# Patient Record
Sex: FEMALE | Race: BLACK OR AFRICAN AMERICAN | NOT HISPANIC OR LATINO | URBAN - METROPOLITAN AREA
[De-identification: names, ages, dates, MRNs, and addresses within clinical notes are randomized per-mention and may not be internally consistent; named-entity substitution may affect disease eponyms.]

---

## 2022-10-25 DIAGNOSIS — D72.819 LEUKOPENIA, UNSPECIFIED TYPE: Primary | ICD-10-CM

## 2022-11-16 ENCOUNTER — OFFICE VISIT (OUTPATIENT)
Dept: HEMATOLOGY/ONCOLOGY | Facility: CLINIC | Age: 52
End: 2022-11-16
Payer: MEDICAID

## 2022-11-16 VITALS
SYSTOLIC BLOOD PRESSURE: 124 MMHG | HEART RATE: 67 BPM | DIASTOLIC BLOOD PRESSURE: 84 MMHG | HEIGHT: 65 IN | RESPIRATION RATE: 20 BRPM | WEIGHT: 165.13 LBS | BODY MASS INDEX: 27.51 KG/M2 | TEMPERATURE: 98 F | OXYGEN SATURATION: 97 %

## 2022-11-16 DIAGNOSIS — D69.6 THROMBOCYTOPENIA: ICD-10-CM

## 2022-11-16 DIAGNOSIS — D72.819 LEUKOPENIA, UNSPECIFIED TYPE: Primary | ICD-10-CM

## 2022-11-16 PROCEDURE — 3079F DIAST BP 80-89 MM HG: CPT | Mod: CPTII,,, | Performed by: INTERNAL MEDICINE

## 2022-11-16 PROCEDURE — 3008F PR BODY MASS INDEX (BMI) DOCUMENTED: ICD-10-PCS | Mod: CPTII,,, | Performed by: INTERNAL MEDICINE

## 2022-11-16 PROCEDURE — 1159F MED LIST DOCD IN RCRD: CPT | Mod: CPTII,,, | Performed by: INTERNAL MEDICINE

## 2022-11-16 PROCEDURE — 3079F PR MOST RECENT DIASTOLIC BLOOD PRESSURE 80-89 MM HG: ICD-10-PCS | Mod: CPTII,,, | Performed by: INTERNAL MEDICINE

## 2022-11-16 PROCEDURE — 99203 PR OFFICE/OUTPT VISIT, NEW, LEVL III, 30-44 MIN: ICD-10-PCS | Mod: ,,, | Performed by: INTERNAL MEDICINE

## 2022-11-16 PROCEDURE — 99203 OFFICE O/P NEW LOW 30 MIN: CPT | Mod: ,,, | Performed by: INTERNAL MEDICINE

## 2022-11-16 PROCEDURE — 3008F BODY MASS INDEX DOCD: CPT | Mod: CPTII,,, | Performed by: INTERNAL MEDICINE

## 2022-11-16 PROCEDURE — 3074F SYST BP LT 130 MM HG: CPT | Mod: CPTII,,, | Performed by: INTERNAL MEDICINE

## 2022-11-16 PROCEDURE — 3074F PR MOST RECENT SYSTOLIC BLOOD PRESSURE < 130 MM HG: ICD-10-PCS | Mod: CPTII,,, | Performed by: INTERNAL MEDICINE

## 2022-11-16 PROCEDURE — 1159F PR MEDICATION LIST DOCUMENTED IN MEDICAL RECORD: ICD-10-PCS | Mod: CPTII,,, | Performed by: INTERNAL MEDICINE

## 2022-11-16 RX ORDER — LEVOTHYROXINE SODIUM 175 UG/1
TABLET ORAL
COMMUNITY
Start: 2022-09-29

## 2022-11-16 RX ORDER — FLUTICASONE PROPIONATE 50 MCG
2 SPRAY, SUSPENSION (ML) NASAL DAILY
COMMUNITY
Start: 2022-04-04

## 2022-11-16 RX ORDER — IBUPROFEN 800 MG/1
800 TABLET ORAL EVERY 6 HOURS PRN
COMMUNITY
Start: 2022-08-04

## 2022-11-16 RX ORDER — LINACLOTIDE 145 UG/1
145 CAPSULE, GELATIN COATED ORAL DAILY
COMMUNITY
Start: 2022-10-17

## 2022-11-16 RX ORDER — TRAMADOL HYDROCHLORIDE 50 MG/1
TABLET ORAL
COMMUNITY
Start: 2022-10-22

## 2022-11-16 RX ORDER — GABAPENTIN 100 MG/1
CAPSULE ORAL
COMMUNITY

## 2022-11-16 RX ORDER — SUMATRIPTAN SUCCINATE 100 MG/1
TABLET ORAL
COMMUNITY
Start: 2022-05-12

## 2022-11-16 RX ORDER — CETIRIZINE HYDROCHLORIDE 10 MG/1
TABLET ORAL
COMMUNITY

## 2022-11-16 RX ORDER — TRAZODONE HYDROCHLORIDE 50 MG/1
TABLET ORAL
COMMUNITY
Start: 2022-05-12

## 2022-11-16 RX ORDER — FAMOTIDINE 40 MG/1
40 TABLET, FILM COATED ORAL NIGHTLY
COMMUNITY
Start: 2022-09-29

## 2022-11-16 RX ORDER — CYCLOBENZAPRINE HCL 10 MG
TABLET ORAL
COMMUNITY

## 2022-11-16 NOTE — PROGRESS NOTES
[facility] at [facility]    PATIENT: Leslye Ravi  MRN: [id number]  DATE: 2022      Diagnosis:   1. Leukopenia, unspecified type    2. Thrombocytopenia        Chief Complaint:     Heme/Onc History: Pt referred for leucopenia and thrombocytopenia      Subjective:    Interval History: Ms. Ravi was referred for bi-cytopenia.  She has been aware of this for 3-4 years, and previously saw Dr. Zuñiga in [place]. She had lab work, but did not require a bone marrow.  I don't have any results of that evaluation.  She was referred here for a WBC of 2.09 and PLT of 122.  Her H/H is 12.0/36.0.  I have a CBC from 2021, and her WBC was 9.6 and her Plt were 110.  She denies frequent infections, fever, mouth sores, adenopathy.  She notes occasional night sweats (q 3-4 weeks for the past 12 months.)  She notes joint pain in her knees and hips, and has fairly significant back pain.  An MRI showed degenerative disease.     Past Medical History:   Past Medical History:   Diagnosis Date    Migraine     Sickle-cell disease without crisis     Sickle cell Trait       Past Surgical HIstory:   Past Surgical History:   Procedure Laterality Date    ABLATION       SECTION      LIPOMA RESECTION Bilateral     TONSILLECTOMY      TUBAL LIGATION         Family History:   Family History   Problem Relation Age of Onset    Arthritis Mother     Glaucoma Mother     Lung cancer Father     Hypertension Sister     Thyroid disease Sister     Seizures Brother     Hypertension Brother     Thyroid disease Brother        Social History:  reports that she has never smoked. She has never used smokeless tobacco. She reports current alcohol use. She reports that she does not use drugs.    Allergies:  Review of patient's allergies indicates:   Allergen Reactions    Verapamil      Other reaction(s): unknown    Penicillins Rash     Other reaction(s): unknown       Medications:  Current Outpatient Medications  "  Medication Sig Dispense Refill    cetirizine (ZYRTEC) 10 MG tablet cetirizine 10 mg tablet      cyclobenzaprine (FLEXERIL) 10 MG tablet cyclobenzaprine 10 mg tablet      famotidine (PEPCID) 40 MG tablet Take 40 mg by mouth every evening.      fluticasone propionate (FLONASE) 50 mcg/actuation nasal spray 2 sprays by Each Nostril route once daily.      gabapentin (NEURONTIN) 100 MG capsule gabapentin 100 mg capsule      ibuprofen (ADVIL,MOTRIN) 800 MG tablet Take 800 mg by mouth every 6 (six) hours as needed.      levothyroxine (SYNTHROID, LEVOTHROID) 175 MCG tablet levothyroxine 175 mcg tablet      LINZESS 145 mcg Cap capsule Take 145 mcg by mouth once daily.      sumatriptan (IMITREX) 100 MG tablet sumatriptan 100 mg tablet      traMADoL (ULTRAM) 50 mg tablet SMARTSI Tablet(s) By Mouth Every 12 Hours PRN      traZODone (DESYREL) 50 MG tablet trazodone 50 mg tablet       No current facility-administered medications for this visit.       Review of Systems   Constitutional:  Positive for diaphoresis.   Musculoskeletal:  Positive for arthralgias and back pain.   All other systems reviewed and are negative.    Objective:      Vitals:   Vitals:    22 1120   BP: 124/84   BP Location: Right arm   Patient Position: Sitting   BP Method: Medium (Automatic)   Pulse: 67   Resp: 20   Temp: 98.1 °F (36.7 °C)   TempSrc: Oral   SpO2: 97%   Weight: 74.9 kg (165 lb 1.6 oz)   Height: 5' 5" (1.651 m)     BMI: Body mass index is 27.47 kg/m².    Physical Exam  Vitals reviewed.   Constitutional:       Appearance: Normal appearance.   HENT:      Head: Normocephalic and atraumatic.   Cardiovascular:      Rate and Rhythm: Normal rate and regular rhythm.      Heart sounds: Normal heart sounds.   Pulmonary:      Effort: Pulmonary effort is normal.      Breath sounds: Normal breath sounds.   Abdominal:      General: Abdomen is flat. Bowel sounds are normal.      Palpations: Abdomen is soft.   Musculoskeletal:         General: Normal " range of motion.      Cervical back: Neck supple.   Neurological:      General: No focal deficit present.      Mental Status: She is alert.   Psychiatric:         Mood and Affect: Mood normal.         Behavior: Behavior normal.         Thought Content: Thought content normal.     Laboratory Data:      Imaging:   Assessment/Plan:       1. Leukopenia, unspecified type    2. Thrombocytopenia      Pt with bi-cytopenia.  Will do lab eval to include RAMONE, nutritional studies, hepatitis testing, anti-platelet Ab, and SPEP.  Follow up for results in 3 weeks.           Gregoria Welch MD

## 2022-12-19 ENCOUNTER — OFFICE VISIT (OUTPATIENT)
Dept: HEMATOLOGY/ONCOLOGY | Facility: CLINIC | Age: 52
End: 2022-12-19
Payer: MEDICAID

## 2022-12-19 VITALS
TEMPERATURE: 98 F | HEIGHT: 65 IN | DIASTOLIC BLOOD PRESSURE: 58 MMHG | OXYGEN SATURATION: 100 % | BODY MASS INDEX: 27.14 KG/M2 | SYSTOLIC BLOOD PRESSURE: 124 MMHG | WEIGHT: 162.88 LBS | HEART RATE: 60 BPM | RESPIRATION RATE: 18 BRPM

## 2022-12-19 DIAGNOSIS — D61.818 PANCYTOPENIA: Primary | ICD-10-CM

## 2022-12-19 PROCEDURE — 3074F PR MOST RECENT SYSTOLIC BLOOD PRESSURE < 130 MM HG: ICD-10-PCS | Mod: CPTII,,, | Performed by: INTERNAL MEDICINE

## 2022-12-19 PROCEDURE — 99214 OFFICE O/P EST MOD 30 MIN: CPT | Mod: ,,, | Performed by: INTERNAL MEDICINE

## 2022-12-19 PROCEDURE — 3074F SYST BP LT 130 MM HG: CPT | Mod: CPTII,,, | Performed by: INTERNAL MEDICINE

## 2022-12-19 PROCEDURE — 3008F BODY MASS INDEX DOCD: CPT | Mod: CPTII,,, | Performed by: INTERNAL MEDICINE

## 2022-12-19 PROCEDURE — 3008F PR BODY MASS INDEX (BMI) DOCUMENTED: ICD-10-PCS | Mod: CPTII,,, | Performed by: INTERNAL MEDICINE

## 2022-12-19 PROCEDURE — 99214 PR OFFICE/OUTPT VISIT, EST, LEVL IV, 30-39 MIN: ICD-10-PCS | Mod: ,,, | Performed by: INTERNAL MEDICINE

## 2022-12-19 PROCEDURE — 1159F PR MEDICATION LIST DOCUMENTED IN MEDICAL RECORD: ICD-10-PCS | Mod: CPTII,,, | Performed by: INTERNAL MEDICINE

## 2022-12-19 PROCEDURE — 3078F DIAST BP <80 MM HG: CPT | Mod: CPTII,,, | Performed by: INTERNAL MEDICINE

## 2022-12-19 PROCEDURE — 3078F PR MOST RECENT DIASTOLIC BLOOD PRESSURE < 80 MM HG: ICD-10-PCS | Mod: CPTII,,, | Performed by: INTERNAL MEDICINE

## 2022-12-19 PROCEDURE — 1159F MED LIST DOCD IN RCRD: CPT | Mod: CPTII,,, | Performed by: INTERNAL MEDICINE

## 2022-12-19 RX ORDER — OMEPRAZOLE 40 MG/1
40 CAPSULE, DELAYED RELEASE ORAL
COMMUNITY

## 2022-12-19 RX ORDER — NAPROXEN 500 MG/1
500 TABLET ORAL 2 TIMES DAILY WITH MEALS
COMMUNITY
Start: 2022-11-24

## 2022-12-19 NOTE — PROGRESS NOTES
[facility]cer Center at [facility]    PATIENT: Leslye Ravi  MRN: [id number]  DATE: 2022      Diagnosis:   1. Pancytopenia          Chief Complaint:     Heme/Onc History: Ms. Ravi was referred for bi-cytopenia.  She has been aware of this for 3-4 years, and previously saw Dr. Zuñiga in [place]. She had lab work, but did not require a bone marrow.  I don't have any results of that evaluation.  She was referred here for a WBC of 2.09 and PLT of 122.  Her H/H is 12.0/36.0.  I have a CBC from 2021, and her WBC was 9.6 and her Plt were 110.  She denies frequent infections, fever, mouth sores, adenopathy.  She notes occasional night sweats (q 3-4 weeks for the past 12 months.)  She notes joint pain in her knees and hips, and has fairly significant back pain.  An MRI showed degenerative disease.     Subjective:    Interval History: Ms. Ravi returns for follow up of pancytopenia.  Her evaluation was positive for anti-platelet antibodies and a positive SSA.  She notes occasional dry eyes and dry mouth, but her main complaints are continued joint pain. Hepatitis studies were negative.  Ferritin was elevated at 500, but other iron studies were normal, suggesting an inflammatory state.  B-12 and folate were normal.  RF and CCP were previously negative.  SPEP was normal.    Past Medical History:   Past Medical History:   Diagnosis Date    Migraine     Sickle cell trait        Past Surgical HIstory:   Past Surgical History:   Procedure Laterality Date    ABLATION       SECTION      LIPOMA RESECTION Bilateral     TONSILLECTOMY      TUBAL LIGATION         Family History:   Family History   Problem Relation Age of Onset    Arthritis Mother     Glaucoma Mother     Lung cancer Father     Hypertension Sister     Thyroid disease Sister     Seizures Brother     Hypertension Brother     Thyroid disease Brother        Social History:  reports that she has never smoked. She has never used  "smokeless tobacco. She reports current alcohol use. She reports that she does not use drugs.    Allergies:  Review of patient's allergies indicates:   Allergen Reactions    Verapamil      Other reaction(s): unknown    Penicillins Rash     Other reaction(s): unknown       Medications:  Current Outpatient Medications   Medication Sig Dispense Refill    cetirizine (ZYRTEC) 10 MG tablet cetirizine 10 mg tablet      famotidine (PEPCID) 40 MG tablet Take 40 mg by mouth every evening.      fluticasone propionate (FLONASE) 50 mcg/actuation nasal spray 2 sprays by Each Nostril route once daily.      gabapentin (NEURONTIN) 100 MG capsule gabapentin 100 mg capsule      ibuprofen (ADVIL,MOTRIN) 800 MG tablet Take 800 mg by mouth every 6 (six) hours as needed.      levothyroxine (SYNTHROID, LEVOTHROID) 175 MCG tablet levothyroxine 175 mcg tablet      LINZESS 145 mcg Cap capsule Take 145 mcg by mouth once daily.      naproxen (NAPROSYN) 500 MG tablet Take 500 mg by mouth 2 (two) times daily with meals.      omeprazole (PRILOSEC) 40 MG capsule Take 40 mg by mouth.      sumatriptan (IMITREX) 100 MG tablet sumatriptan 100 mg tablet      traMADoL (ULTRAM) 50 mg tablet SMARTSI Tablet(s) By Mouth Every 12 Hours PRN      traZODone (DESYREL) 50 MG tablet trazodone 50 mg tablet      cyclobenzaprine (FLEXERIL) 10 MG tablet cyclobenzaprine 10 mg tablet       No current facility-administered medications for this visit.       Review of Systems   Constitutional:  Positive for diaphoresis.   Musculoskeletal:  Positive for arthralgias and back pain.   All other systems reviewed and are negative.    Objective:      Vitals:   Vitals:    22 1323   BP: (!) 124/58   BP Location: Right arm   Patient Position: Sitting   BP Method: Medium (Automatic)   Pulse: 60   Resp: 18   Temp: 98 °F (36.7 °C)   TempSrc: Oral   SpO2: 100%   Weight: 73.9 kg (162 lb 14.4 oz)   Height: 5' 5" (1.651 m)       BMI: Body mass index is 27.11 kg/m².    Physical " Exam  Vitals reviewed.   Constitutional:       Appearance: Normal appearance.   HENT:      Head: Normocephalic and atraumatic.   Cardiovascular:      Rate and Rhythm: Normal rate and regular rhythm.      Heart sounds: Normal heart sounds.   Pulmonary:      Effort: Pulmonary effort is normal.      Breath sounds: Normal breath sounds.   Abdominal:      General: Abdomen is flat. Bowel sounds are normal.      Palpations: Abdomen is soft.   Musculoskeletal:         General: Normal range of motion.      Cervical back: Neck supple.   Neurological:      General: No focal deficit present.      Mental Status: She is alert.   Psychiatric:         Mood and Affect: Mood normal.         Behavior: Behavior normal.         Thought Content: Thought content normal.     Laboratory Data:      Imaging:   Assessment/Plan:       1. Pancytopenia    Pt with pancytopenia, which I suspect is related to autoimmune disease, but need to rule out marrow pathology.  Will schedule bone marrow and see her back 2 weeks afterwards.  Will refer to rheumatology.           Gregoria Welch MD

## 2023-01-03 DIAGNOSIS — D61.818 PANCYTOPENIA: Primary | ICD-10-CM

## 2023-01-11 ENCOUNTER — TELEPHONE (OUTPATIENT)
Dept: HEMATOLOGY/ONCOLOGY | Facility: CLINIC | Age: 53
End: 2023-01-11
Payer: MEDICAID

## 2023-01-19 ENCOUNTER — OFFICE VISIT (OUTPATIENT)
Dept: HEMATOLOGY/ONCOLOGY | Facility: CLINIC | Age: 53
End: 2023-01-19
Payer: MEDICAID

## 2023-01-19 VITALS — BODY MASS INDEX: 27.32 KG/M2 | HEIGHT: 65 IN | WEIGHT: 164 LBS

## 2023-01-19 DIAGNOSIS — R76.8 POSITIVE ANA (ANTINUCLEAR ANTIBODY): ICD-10-CM

## 2023-01-19 DIAGNOSIS — D61.818 PANCYTOPENIA: Primary | ICD-10-CM

## 2023-01-19 PROCEDURE — 1159F PR MEDICATION LIST DOCUMENTED IN MEDICAL RECORD: ICD-10-PCS | Mod: CPTII,95,, | Performed by: INTERNAL MEDICINE

## 2023-01-19 PROCEDURE — 3008F PR BODY MASS INDEX (BMI) DOCUMENTED: ICD-10-PCS | Mod: CPTII,95,, | Performed by: INTERNAL MEDICINE

## 2023-01-19 PROCEDURE — 3008F BODY MASS INDEX DOCD: CPT | Mod: CPTII,95,, | Performed by: INTERNAL MEDICINE

## 2023-01-19 PROCEDURE — 1159F MED LIST DOCD IN RCRD: CPT | Mod: CPTII,95,, | Performed by: INTERNAL MEDICINE

## 2023-01-19 PROCEDURE — 99213 OFFICE O/P EST LOW 20 MIN: CPT | Mod: 95,,, | Performed by: INTERNAL MEDICINE

## 2023-01-19 PROCEDURE — 99213 PR OFFICE/OUTPT VISIT, EST, LEVL III, 20-29 MIN: ICD-10-PCS | Mod: 95,,, | Performed by: INTERNAL MEDICINE

## 2023-01-19 RX ORDER — LACTULOSE 10 G/15ML
SOLUTION ORAL; RECTAL
COMMUNITY
Start: 2023-01-03

## 2023-01-19 RX ORDER — DICYCLOMINE HYDROCHLORIDE 10 MG/1
10 CAPSULE ORAL EVERY 6 HOURS PRN
COMMUNITY
Start: 2023-01-03

## 2023-01-19 NOTE — PROGRESS NOTES
Cancer Center at [facility]    PATIENT: Leslye Ravi  MRN: [id number]  DATE: 1/19/2023      Diagnosis:   1. Pancytopenia    2. Positive RAMONE (antinuclear antibody)            Chief Complaint:     Heme/Onc History: Ms. Ravi was referred for bi-cytopenia.  She has been aware of this for 3-4 years, and previously saw Dr. Zuñiga in [place]. She had lab work, but did not require a bone marrow.  I don't have any results of that evaluation.  She was referred here for a WBC of 2.09 and PLT of 122.  Her H/H is 12.0/36.0.  I have a CBC from August of 2021, and her WBC was 9.6 and her Plt were 110.  She denies frequent infections, fever, mouth sores, adenopathy.  She notes occasional night sweats (q 3-4 weeks for the past 12 months.)  She notes joint pain in her knees and hips, and has fairly significant back pain.  An MRI showed degenerative disease.     1/13/2022 bone Marrow Biopsy:  normocellular marrow (40%) with trilineage hematopoiesis, minimal reactive plasmocytosis and increased iron stores. No evidence of malignancy.;bone marrow aspirate reveal cellular marrow with trilineage hematopoiesis. The overall M:E ratio is 2.4:1, both myeloid and erythroid series mature to completion. No significant dyspoiesis is noted. No increased blasts. Plasma cells are noted and account for <5% of total cellularity. No ringed sideroblasts.     Subjective:    Interval History: Ms. Ravi returns for follow up of pancytopenia.    1/23/22 phone visit to go over results of bone marrow biopsy which are normal.   12/19/22 Her evaluation was positive for anti-platelet antibodies and a positive SSA.  She notes occasional dry eyes and dry mouth, but her main complaints are continued joint pain. Hepatitis studies were negative.  Ferritin was elevated at 500, but other iron studies were normal, suggesting an inflammatory state.  B-12 and folate were normal.  RF and CCP were previously negative.  SPEP was normal.    Past  Medical History:   Past Medical History:   Diagnosis Date    Migraine     Sickle cell trait        Past Surgical HIstory:   Past Surgical History:   Procedure Laterality Date    ABLATION      BONE MARROW BIOPSY W/ ASPIRATION       SECTION      LIPOMA RESECTION Bilateral     TONSILLECTOMY      TUBAL LIGATION         Family History:   Family History   Problem Relation Age of Onset    Arthritis Mother     Glaucoma Mother     Lung cancer Father     Hypertension Sister     Thyroid disease Sister     Seizures Brother     Hypertension Brother     Thyroid disease Brother        Social History:  reports that she has never smoked. She has never used smokeless tobacco. She reports current alcohol use. She reports that she does not use drugs.    Allergies:  Review of patient's allergies indicates:   Allergen Reactions    Verapamil      Other reaction(s): unknown    Penicillins Rash     Other reaction(s): unknown       Medications:  Current Outpatient Medications   Medication Sig Dispense Refill    cetirizine (ZYRTEC) 10 MG tablet cetirizine 10 mg tablet      cyclobenzaprine (FLEXERIL) 10 MG tablet cyclobenzaprine 10 mg tablet      dicyclomine (BENTYL) 10 MG capsule Take 10 mg by mouth every 6 (six) hours as needed.      famotidine (PEPCID) 40 MG tablet Take 40 mg by mouth every evening.      fluticasone propionate (FLONASE) 50 mcg/actuation nasal spray 2 sprays by Each Nostril route once daily.      gabapentin (NEURONTIN) 100 MG capsule gabapentin 100 mg capsule      ibuprofen (ADVIL,MOTRIN) 800 MG tablet Take 800 mg by mouth every 6 (six) hours as needed.      lactulose (CHRONULAC) 10 gram/15 mL solution SMARTSI Milliliter(s) By Mouth Twice Daily PRN      levothyroxine (SYNTHROID, LEVOTHROID) 175 MCG tablet levothyroxine 175 mcg tablet      LINZESS 145 mcg Cap capsule Take 145 mcg by mouth once daily.      naproxen (NAPROSYN) 500 MG tablet Take 500 mg by mouth 2 (two) times daily with meals.      omeprazole  "(PRILOSEC) 40 MG capsule Take 40 mg by mouth.      sumatriptan (IMITREX) 100 MG tablet sumatriptan 100 mg tablet      traMADoL (ULTRAM) 50 mg tablet SMARTSI Tablet(s) By Mouth Every 12 Hours PRN      traZODone (DESYREL) 50 MG tablet trazodone 50 mg tablet       No current facility-administered medications for this visit.       Review of Systems   Constitutional:  Positive for diaphoresis. Negative for appetite change and unexpected weight change.   HENT:  Negative for mouth sores.    Eyes:  Negative for visual disturbance.   Respiratory:  Negative for cough and shortness of breath.    Cardiovascular:  Negative for chest pain.   Gastrointestinal:  Negative for abdominal pain and diarrhea.   Genitourinary:  Negative for frequency.   Musculoskeletal:  Positive for arthralgias and back pain.   Skin:  Negative for rash.   Neurological:  Positive for headaches.   Hematological:  Negative for adenopathy.   Psychiatric/Behavioral:  The patient is not nervous/anxious.    All other systems reviewed and are negative.    Objective:      Vitals:   Vitals:    23 1533   Weight: 74.4 kg (164 lb)   Height: 5' 5" (1.651 m)       BMI: Body mass index is 27.29 kg/m².    Physical Exam  Vitals reviewed.   Constitutional:       Appearance: Normal appearance.   HENT:      Head: Normocephalic and atraumatic.   Cardiovascular:      Rate and Rhythm: Normal rate and regular rhythm.      Heart sounds: Normal heart sounds.   Pulmonary:      Effort: Pulmonary effort is normal.      Breath sounds: Normal breath sounds.   Abdominal:      General: Abdomen is flat. Bowel sounds are normal.      Palpations: Abdomen is soft.   Musculoskeletal:         General: Normal range of motion.      Cervical back: Neck supple.   Neurological:      General: No focal deficit present.      Mental Status: She is alert.   Psychiatric:         Mood and Affect: Mood normal.         Behavior: Behavior normal.         Thought Content: Thought content normal. "     Laboratory Data:      Imaging:   Assessment/Plan:       1. Pancytopenia    2. Positive RAMONE (antinuclear antibody)      Pt with pancytopenia, which I suspect is related to autoimmune disease, but needed to rule out marrow pathology.   bone marrow is normal. RTC in 6 months with CBC, keep appt with rheumatology (refer made last visit by Dr. Welch but she has not heard from anyone yet)      Sujey Zuñiga MD      Established Patient - Audio Only Telehealth Visit     The patient location is: work  The chief complaint leading to consultation is: go over bonemarrow  Visit type: Virtual visit with audio only (telephone)  Total time spent with patient: 22       The reason for the audio only service rather than synchronous audio and video virtual visit was related to technical difficulties or patient preference/necessity.     Each patient to whom I provide medical services by telemedicine is:  (1) informed of the relationship between the physician and patient and the respective role of any other health care provider with respect to management of the patient; and (2) notified that they may decline to receive medical services by telemedicine and may withdraw from such care at any time. Patient verbally consented to receive this service via voice-only telephone call.     This service was not originating from a related E/M service provided within the previous 7 days nor will  to an E/M service or procedure within the next 24 hours or my soonest available appointment.  Prevailing standard of care was able to be met in this audio-only visit.

## 2023-03-12 PROBLEM — E03.9 HYPOTHYROIDISM: Status: ACTIVE | Noted: 2019-02-05

## 2023-03-12 PROBLEM — K58.9 IBS (IRRITABLE BOWEL SYNDROME): Status: ACTIVE | Noted: 2022-05-12

## 2023-03-12 PROBLEM — R73.9 HYPERGLYCEMIA: Status: ACTIVE | Noted: 2022-05-12

## 2023-03-12 PROBLEM — G43.009 MIGRAINE WITHOUT AURA AND WITHOUT STATUS MIGRAINOSUS, NOT INTRACTABLE: Status: ACTIVE | Noted: 2022-05-12

## 2023-03-12 PROBLEM — K44.9 HIATAL HERNIA: Status: ACTIVE | Noted: 2022-05-12

## 2023-03-12 PROBLEM — D69.3 CHRONIC IDIOPATHIC THROMBOCYTOPENIA: Status: ACTIVE | Noted: 2022-05-12

## 2023-03-12 PROBLEM — M51.36 DEGENERATIVE DISC DISEASE, LUMBAR: Status: ACTIVE | Noted: 2023-03-12

## 2023-03-12 PROBLEM — G93.9 BRAIN LESION: Status: ACTIVE | Noted: 2022-05-12

## 2023-03-12 PROBLEM — M47.26 OSTEOARTHRITIS OF SPINE WITH RADICULOPATHY, LUMBAR REGION: Status: ACTIVE | Noted: 2022-05-12

## 2023-03-12 PROBLEM — F51.01 PRIMARY INSOMNIA: Status: ACTIVE | Noted: 2022-05-12

## 2023-03-12 PROBLEM — E66.9 OBESITY: Status: ACTIVE | Noted: 2023-03-12

## 2023-03-12 PROBLEM — M81.0 OSTEOPOROSIS: Status: ACTIVE | Noted: 2022-05-12

## 2023-03-12 PROBLEM — K21.9 GERD (GASTROESOPHAGEAL REFLUX DISEASE): Status: ACTIVE | Noted: 2022-05-12

## 2023-03-12 PROBLEM — K25.9 STOMACH ULCER: Status: ACTIVE | Noted: 2022-05-12
